# Patient Record
Sex: FEMALE | Race: WHITE | Employment: STUDENT | URBAN - METROPOLITAN AREA
[De-identification: names, ages, dates, MRNs, and addresses within clinical notes are randomized per-mention and may not be internally consistent; named-entity substitution may affect disease eponyms.]

---
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
## 2018-07-11 ENCOUNTER — OFFICE VISIT (OUTPATIENT)
Dept: INTERNAL MEDICINE CLINIC | Facility: CLINIC | Age: 18
End: 2018-07-11

## 2018-07-11 VITALS
BODY MASS INDEX: 19.1 KG/M2 | WEIGHT: 126 LBS | SYSTOLIC BLOOD PRESSURE: 102 MMHG | HEART RATE: 91 BPM | OXYGEN SATURATION: 98 % | HEIGHT: 68 IN | DIASTOLIC BLOOD PRESSURE: 60 MMHG | TEMPERATURE: 98 F

## 2018-07-11 DIAGNOSIS — Z30.011 BCP (BIRTH CONTROL PILLS) INITIATION: ICD-10-CM

## 2018-07-11 DIAGNOSIS — Z00.00 ANNUAL PHYSICAL EXAM: Primary | ICD-10-CM

## 2018-07-11 LAB
CONTROL LINE PRESENT WITH A CLEAR BACKGROUND (YES/NO): YES YES/NO
KIT LOT #: NORMAL NUMERIC
PREGNANCY TEST, URINE: NEGATIVE

## 2018-07-11 PROCEDURE — 81025 URINE PREGNANCY TEST: CPT | Performed by: INTERNAL MEDICINE

## 2018-07-11 PROCEDURE — 99395 PREV VISIT EST AGE 18-39: CPT | Performed by: INTERNAL MEDICINE

## 2018-07-11 RX ORDER — FEXOFENADINE HCL AND PSEUDOEPHEDRINE HCI 180; 240 MG/1; MG/1
1 TABLET, EXTENDED RELEASE ORAL AS NEEDED
COMMUNITY

## 2018-07-11 RX ORDER — NORGESTIMATE AND ETHINYL ESTRADIOL 0.25-0.035
1 KIT ORAL DAILY
Qty: 3 PACKAGE | Refills: 3 | Status: SHIPPED | OUTPATIENT
Start: 2018-07-11 | End: 2018-11-07

## 2018-07-11 NOTE — PROGRESS NOTES
Cheko Patterson is a 21year old female. Patient presents with:  Physical: Physical needed for college. she's currently in her [employer] year. She will rtc to drop off physical form to be completed. Interested in birth control plan.        HPI:   Cheko Leonardo contacts and recently had her eyes examined. She is not worried about her weight, and has no problems with eating. Her mother is accompanying her, and denies family history of early cardiac death, or sudden death before the age of 48.  She denies any fa Type II.     • Alcohol and Other Disorders Associated Maternal Grandfather    • Lipids Maternal Grandfather    • Stroke Maternal Grandfather    • High Cholesterol Maternal Grandfather    • High Cholesterol Paternal Grandmother    • High Blood Pressure Pater bilaterally  MUSCULOSKELETAL: normal range of motion of cervical, thoracic and lumbar spine. No scoliosis. Able to squat and walk 10 ft without discomfort, jumping on each foot 5 times without discomfort. Normal gait.    NEURO: 2+ biceps and patellar reflex

## 2018-08-03 ENCOUNTER — TELEPHONE (OUTPATIENT)
Dept: OTOLARYNGOLOGY | Facility: CLINIC | Age: 18
End: 2018-08-03

## 2018-08-03 ENCOUNTER — OFFICE VISIT (OUTPATIENT)
Dept: OTOLARYNGOLOGY | Facility: CLINIC | Age: 18
End: 2018-08-03
Payer: COMMERCIAL

## 2018-08-03 VITALS
WEIGHT: 126 LBS | SYSTOLIC BLOOD PRESSURE: 112 MMHG | BODY MASS INDEX: 19.1 KG/M2 | TEMPERATURE: 98 F | HEIGHT: 68 IN | DIASTOLIC BLOOD PRESSURE: 56 MMHG

## 2018-08-03 DIAGNOSIS — R04.0 EPISTAXIS: Primary | ICD-10-CM

## 2018-08-03 PROCEDURE — 99203 OFFICE O/P NEW LOW 30 MIN: CPT | Performed by: OTOLARYNGOLOGY

## 2018-08-03 PROCEDURE — 30901 CONTROL OF NOSEBLEED: CPT | Performed by: OTOLARYNGOLOGY

## 2018-08-03 NOTE — TELEPHONE ENCOUNTER
Pt advised symptoms are not abnormal. Pt to avoid blowing nose, use neosporin as prescribed. Pt verbalized understanding.

## 2018-08-03 NOTE — TELEPHONE ENCOUNTER
Pt called stating pt was seen today 8-3-18. Pt's nose was cauterized. Now pt is sneezing, runny nose and watery eyes.   Please call pt to advise

## 2018-08-04 NOTE — PROGRESS NOTES
Paul Tang is a 19year old female. Patient presents with:  Epistaxis: nose bleed at her both nostrils for 3 weeks     HPI:   For some time she has been experiencing problems with nosebleeds.   Bleeding can occur randomly and can be relatively heavy at nerves II through XII grossly intact. Neck Exam Normal Inspection - Normal. Palpation - Normal. Parotid gland - Normal. Thyroid gland - Normal.   Psychiatric Normal Orientation - Oriented to time, place, person & situation. Appropriate mood and affect.

## 2018-08-21 ENCOUNTER — TELEPHONE (OUTPATIENT)
Dept: INTERNAL MEDICINE CLINIC | Facility: CLINIC | Age: 18
End: 2018-08-21

## 2018-08-22 NOTE — TELEPHONE ENCOUNTER
Nurse called patient and spoke to mother as per HIPPA. Nurse stated that the forms are ready for pickup at the [address] in [address]. Patient mother verbalized understanding.

## 2018-11-07 ENCOUNTER — TELEPHONE (OUTPATIENT)
Dept: INTERNAL MEDICINE CLINIC | Facility: CLINIC | Age: 18
End: 2018-11-07

## 2018-11-07 RX ORDER — NORGESTIMATE AND ETHINYL ESTRADIOL 0.25-0.035
1 KIT ORAL DAILY
Qty: 90 TABLET | Refills: 3 | Status: SHIPPED | OUTPATIENT
Start: 2018-11-07 | End: 2019-06-28

## 2018-11-07 NOTE — TELEPHONE ENCOUNTER
[facility] requesting refill for:  Norgest/E ES (Estarylla) TB 28S 0.025/35, Qty 90 w/4 refills  Tasked to [employer]

## 2019-06-28 ENCOUNTER — OFFICE VISIT (OUTPATIENT)
Dept: INTERNAL MEDICINE CLINIC | Facility: CLINIC | Age: 19
End: 2019-06-28
Payer: COMMERCIAL

## 2019-06-28 VITALS
SYSTOLIC BLOOD PRESSURE: 106 MMHG | DIASTOLIC BLOOD PRESSURE: 74 MMHG | OXYGEN SATURATION: 99 % | WEIGHT: 130.13 LBS | TEMPERATURE: 99 F | BODY MASS INDEX: 19.72 KG/M2 | HEIGHT: 68 IN | HEART RATE: 84 BPM

## 2019-06-28 DIAGNOSIS — Z30.41 ENCOUNTER FOR SURVEILLANCE OF CONTRACEPTIVE PILLS: ICD-10-CM

## 2019-06-28 DIAGNOSIS — Z00.00 ANNUAL PHYSICAL EXAM: Primary | ICD-10-CM

## 2019-06-28 DIAGNOSIS — Z12.4 CERVICAL CANCER SCREENING: ICD-10-CM

## 2019-06-28 PROCEDURE — 99395 PREV VISIT EST AGE 18-39: CPT | Performed by: INTERNAL MEDICINE

## 2019-06-28 RX ORDER — NORGESTIMATE AND ETHINYL ESTRADIOL 0.25-0.035
1 KIT ORAL DAILY
Qty: 90 TABLET | Refills: 3 | Status: SHIPPED | OUTPATIENT
Start: 2019-06-28 | End: 2019-12-09

## 2019-06-28 NOTE — PROGRESS NOTES
Shemar Rhoades is a 19year old female. Patient presents with:  Physical: Physical with PAP; Needs Physical/Clearance for Camp      HPI:   Shemar Rhoades is a 19year old female who presents for a complete physical exam and sports physical.     She is at accompanying her, and denies family history of early cardiac death, or sudden death before the age of 48. She denies any family history of abnormal heart rhythms, pacemakers or defibrillators.  Her mother also reports that the patient has a healthy diet, an • Cancer Other         PGGFA- lung   • Other (Other) Other         hearing loss. maternal great grandmother   • Melanoma Other         Family h/o Malignant Melanoma - Relative?    • Hypertension Other         Family h/o Hypertension   • Other (Other) Othe while at [employer]. Encouraged to continue a healthy diet full of protein and vegetables while in college. She is UTD on vaccinations and will consider HPV vaccination at this time. Encouraged to get a flu shot in the fall. Follow up pap done today    2.  OCP

## 2019-07-03 ENCOUNTER — TELEPHONE (OUTPATIENT)
Dept: INTERNAL MEDICINE CLINIC | Facility: CLINIC | Age: 19
End: 2019-07-03

## 2019-07-23 ENCOUNTER — TELEPHONE (OUTPATIENT)
Dept: INTERNAL MEDICINE CLINIC | Facility: CLINIC | Age: 19
End: 2019-07-23

## 2019-07-23 NOTE — TELEPHONE ENCOUNTER
[facility] requesting refill for:  Norgest/E ES 0.25/35, Qty 90 w/4 refills  Tasked to [employer]

## 2019-11-14 RX ORDER — NORGESTIMATE AND ETHINYL ESTRADIOL 0.25-0.035
KIT ORAL
Qty: 3 PACKAGE | Refills: 3 | OUTPATIENT
Start: 2019-11-14

## 2019-11-14 NOTE — TELEPHONE ENCOUNTER
Current refill request refused due to refill is either a duplicate request or has active refills at the pharmacy. Check previous templates.     Requested Prescriptions     Pending Prescriptions Disp Refills   • ESTARYLLA 0.25-35 MG-MCG Oral Tab Patel STEPHENSON

## 2019-12-09 ENCOUNTER — TELEPHONE (OUTPATIENT)
Dept: INTERNAL MEDICINE CLINIC | Facility: CLINIC | Age: 19
End: 2019-12-09

## 2019-12-09 RX ORDER — NORGESTIMATE AND ETHINYL ESTRADIOL 0.25-0.035
1 KIT ORAL DAILY
Qty: 84 TABLET | Refills: 1 | Status: SHIPPED | OUTPATIENT
Start: 2019-12-09 | End: 2020-05-08

## 2019-12-09 NOTE — TELEPHONE ENCOUNTER
Pt requesting that NEW RX for   Norgestimate Eth Estradiol be sent to [facility]  Refill was sent in error to local pharmacy in June  Pt is out of medication  Tasked to [employer]

## 2019-12-09 NOTE — TELEPHONE ENCOUNTER
Reviewed and Rx done  Requested Prescriptions     Signed Prescriptions Disp Refills   • Norgestimate-Eth Estradiol (ESTARYLLA) 0.25-35 MG-MCG Oral Tab 84 tablet 1     Sig: Take 1 tablet by mouth daily.      Authorizing Provider: Sandip Jewell     Ordering Use

## 2020-05-08 RX ORDER — NORGESTIMATE AND ETHINYL ESTRADIOL 0.25-0.035
KIT ORAL
Qty: 84 TABLET | Refills: 3 | Status: SHIPPED | OUTPATIENT
Start: 2020-05-08 | End: 2021-03-17

## 2020-05-08 NOTE — TELEPHONE ENCOUNTER
Pt called to check status on refill  Scheduled physical w/Dr Tomasa Rajan for June 29  Please send refill to [facility]

## 2020-06-29 ENCOUNTER — OFFICE VISIT (OUTPATIENT)
Dept: INTERNAL MEDICINE CLINIC | Facility: CLINIC | Age: 20
End: 2020-06-29
Payer: COMMERCIAL

## 2020-06-29 VITALS
TEMPERATURE: 97 F | SYSTOLIC BLOOD PRESSURE: 122 MMHG | HEART RATE: 92 BPM | BODY MASS INDEX: 19.85 KG/M2 | DIASTOLIC BLOOD PRESSURE: 84 MMHG | HEIGHT: 68 IN | OXYGEN SATURATION: 99 % | WEIGHT: 131 LBS

## 2020-06-29 DIAGNOSIS — Z00.00 ANNUAL PHYSICAL EXAM: Primary | ICD-10-CM

## 2020-06-29 PROCEDURE — 99395 PREV VISIT EST AGE 18-39: CPT | Performed by: INTERNAL MEDICINE

## 2020-06-29 RX ORDER — LORATADINE 10 MG/1
10 TABLET ORAL DAILY
COMMUNITY
End: 2021-09-14 | Stop reason: ALTCHOICE

## 2020-06-29 NOTE — PROGRESS NOTES
Frankie Duran is a 22year old female. Patient presents with:  Physical: Pap June 2019. Allergies: Patient having issues with cat allergies.        HPI:   Frankie Duran is a 22year old female who presents for a complete physical exam and sports physic surgery-dental      Family History   Problem Relation Age of Onset   • Asthma Mother    • Allergies Mother    • Depression Mother    • Migraines Mother    • Other (Other) Mother    • Hypertension Father 39   • Obesity Father    • Asthma Sister    • Diabete normal S1S2, no gallops or murmurs  GI: soft, NT, ND, NABS, no HSM  : deferred  EXTREMITIES: no cyanosis, clubbing or edema, +2 radial and DP pulses bilaterally  VASCULAR: +2 radial, carotid and DP pulses bilaterally      ASSESSMENT AND PLAN:     1.  Spor

## 2021-03-17 NOTE — TELEPHONE ENCOUNTER
Refill request for birth control routed for MD review   Patient will be due in June for physical  Last PAP 2019  90 day supply pended    To on call Dr Abhishek Deng to please review

## 2021-03-18 RX ORDER — NORGESTIMATE AND ETHINYL ESTRADIOL 0.25-0.035
KIT ORAL
Qty: 84 TABLET | Refills: 1 | Status: SHIPPED | OUTPATIENT
Start: 2021-03-18 | End: 2021-08-17

## 2021-03-19 NOTE — TELEPHONE ENCOUNTER
eRx sent. Please ask pt to schedule PE with Dr. Israel Feliciano -- would check with Dr. Israel Feliciano re what her schedule is going to look like.

## 2021-03-19 NOTE — TELEPHONE ENCOUNTER
Patient is due for an annual physical in June 2021 as indicated below by nursing. There is no exact schedule for Dr Mikaela Gaston at this time. Patient will be called closer to June 2021 when we have an exact return date and schedule for Dr Mikaela Gaston.

## 2021-08-15 NOTE — TELEPHONE ENCOUNTER
Patient last saw Mireya Colmenares on  6/29/2020. Needs appointment for annual PE.  To EMA  to please call patient and assist with scheduling then back to Rx group for refill

## 2021-08-16 ENCOUNTER — APPOINTMENT (OUTPATIENT)
Dept: GENERAL RADIOLOGY | Age: 21
End: 2021-08-16
Attending: NURSE PRACTITIONER
Payer: COMMERCIAL

## 2021-08-16 ENCOUNTER — HOSPITAL ENCOUNTER (OUTPATIENT)
Age: 21
Discharge: HOME | End: 2021-08-16
Payer: COMMERCIAL

## 2021-08-16 VITALS
WEIGHT: 130 LBS | BODY MASS INDEX: 19.7 KG/M2 | HEART RATE: 96 BPM | OXYGEN SATURATION: 100 % | TEMPERATURE: 97 F | RESPIRATION RATE: 16 BRPM | HEIGHT: 68 IN | SYSTOLIC BLOOD PRESSURE: 132 MMHG | DIASTOLIC BLOOD PRESSURE: 84 MMHG

## 2021-08-16 DIAGNOSIS — S93.401A SPRAIN OF RIGHT ANKLE, UNSPECIFIED LIGAMENT, INITIAL ENCOUNTER: Primary | ICD-10-CM

## 2021-08-16 PROCEDURE — 73610 X-RAY EXAM OF ANKLE: CPT | Performed by: NURSE PRACTITIONER

## 2021-08-16 PROCEDURE — 99213 OFFICE O/P EST LOW 20 MIN: CPT | Performed by: NURSE PRACTITIONER

## 2021-08-16 PROCEDURE — L4350 ANKLE CONTROL ORTHO PRE OTS: HCPCS | Performed by: NURSE PRACTITIONER

## 2021-08-16 RX ORDER — ESCITALOPRAM OXALATE 10 MG/1
TABLET ORAL
COMMUNITY
Start: 2021-08-13

## 2021-08-16 NOTE — ED PROVIDER NOTES
Patient presents with: Ankle Pain      HPI:     Aliyah Olguin is a 19year old female who presents today with a chief complaint of pain in the right ankle after rolling her ankle yesterday. The patient has swelling and bruising present.   She is able to Occupational History      Not on file    Tobacco Use      Smoking status: Never Smoker      Smokeless tobacco: Never Used    Vaping Use      Vaping Use: Never used    Substance and Sexual Activity      Alcohol use: Yes        Comment: social      Drug use: ankle with ambulation and range of motion. No deformity. No foot pain. Moving all the toes of the right foot without difficulty. No calf pain. No Achilles pain. No heel pain.   Point Tenderness: No     Physical Exam:  /84   Pulse 96   Temp 96.9 applied to the right ankle. CMS intact post application. I will recommend ice, elevation, ibuprofen or Tylenol for pain, and follow-up with orthopedics. Diagnosis:    ICD-10-CM    1.  Sprain of right ankle, unspecified ligament, initial encounter  S93.40

## 2021-08-16 NOTE — ED INITIAL ASSESSMENT (HPI)
Pt states twisted R ankle while walking in platform shoes at 130pm yesterday. States fell to ground but denies any other injury. No head injury. No LOC. Positive swelling noted to lateral aspect R ankle. Positive CMS. Positive pedal pulse.   Awake/alert

## 2021-08-17 RX ORDER — NORGESTIMATE AND ETHINYL ESTRADIOL 0.25-0.035
KIT ORAL
Qty: 84 TABLET | Refills: 0 | Status: SHIPPED | OUTPATIENT
Start: 2021-08-17 | End: 2021-11-09

## 2021-08-17 NOTE — TELEPHONE ENCOUNTER
Noted, refill sent.     Requested Prescriptions     Signed Prescriptions Disp Refills   • ESTARYLLA 0.25-35 MG-MCG Oral Tab 84 tablet 0     Sig: TAKE 1 TABLET DAILY     Authorizing Provider: Lindsay Hoover     Ordering User: Carmelita Tyler

## 2021-09-14 ENCOUNTER — OFFICE VISIT (OUTPATIENT)
Dept: INTERNAL MEDICINE CLINIC | Facility: CLINIC | Age: 21
End: 2021-09-14
Payer: COMMERCIAL

## 2021-09-14 VITALS
WEIGHT: 137 LBS | TEMPERATURE: 98 F | BODY MASS INDEX: 20.76 KG/M2 | RESPIRATION RATE: 14 BRPM | HEIGHT: 68 IN | HEART RATE: 74 BPM | OXYGEN SATURATION: 100 % | DIASTOLIC BLOOD PRESSURE: 78 MMHG | SYSTOLIC BLOOD PRESSURE: 122 MMHG

## 2021-09-14 DIAGNOSIS — Z00.00 ANNUAL PHYSICAL EXAM: Primary | ICD-10-CM

## 2021-09-14 DIAGNOSIS — F41.9 ANXIETY: ICD-10-CM

## 2021-09-14 PROCEDURE — 99395 PREV VISIT EST AGE 18-39: CPT | Performed by: INTERNAL MEDICINE

## 2021-09-14 PROCEDURE — 3074F SYST BP LT 130 MM HG: CPT | Performed by: INTERNAL MEDICINE

## 2021-09-14 PROCEDURE — 3008F BODY MASS INDEX DOCD: CPT | Performed by: INTERNAL MEDICINE

## 2021-09-14 PROCEDURE — 3078F DIAST BP <80 MM HG: CPT | Performed by: INTERNAL MEDICINE

## 2021-09-14 RX ORDER — ESCITALOPRAM OXALATE 5 MG/1
5 TABLET ORAL DAILY
COMMUNITY

## 2021-09-14 NOTE — PROGRESS NOTES
Grace Salas is a 19year old female. Patient presents with:  Physical: Annual physical. Pt reports she has been seeing Psych and taking Lexapro. States she is feeling better.        HPI:   Grace Salas is a 19year old female who presents for a comple 2006    oral surgery-dental      Family History   Problem Relation Age of Onset   • Asthma Mother    • Allergies Mother    • Depression Mother    • Migraines Mother    • Other (Other) Mother    • Hypertension Father 39   • Obesity Father    • Asthma Sister carotic bruits, no thyromegaly  LUNGS: clear to auscultation  CARDIO: RRR, normal S1S2, no gallops or murmurs  GI: soft, NT, ND, NABS, no HSM  : deferred  EXTREMITIES: no cyanosis, clubbing or edema, +2 radial and DP pulses bilaterally  VASCULAR: +2 radi

## 2021-11-04 ENCOUNTER — TELEPHONE (OUTPATIENT)
Dept: INTERNAL MEDICINE CLINIC | Facility: CLINIC | Age: 21
End: 2021-11-04

## 2021-11-09 RX ORDER — NORGESTIMATE AND ETHINYL ESTRADIOL 0.25-0.035
KIT ORAL
Qty: 84 TABLET | Refills: 3 | Status: SHIPPED | OUTPATIENT
Start: 2021-11-09

## 2022-11-03 RX ORDER — NORGESTIMATE AND ETHINYL ESTRADIOL 0.25-0.035
KIT ORAL
Qty: 28 TABLET | Refills: 0 | Status: SHIPPED | OUTPATIENT
Start: 2022-11-03

## 2022-11-21 ENCOUNTER — OFFICE VISIT (OUTPATIENT)
Dept: INTERNAL MEDICINE CLINIC | Facility: CLINIC | Age: 22
End: 2022-11-21
Payer: COMMERCIAL

## 2022-11-21 VITALS
SYSTOLIC BLOOD PRESSURE: 106 MMHG | OXYGEN SATURATION: 99 % | DIASTOLIC BLOOD PRESSURE: 70 MMHG | WEIGHT: 136 LBS | BODY MASS INDEX: 20.38 KG/M2 | HEART RATE: 78 BPM | TEMPERATURE: 98 F | HEIGHT: 68.4 IN

## 2022-11-21 DIAGNOSIS — Z12.4 SCREENING FOR CERVICAL CANCER: ICD-10-CM

## 2022-11-21 DIAGNOSIS — F41.9 ANXIETY: ICD-10-CM

## 2022-11-21 DIAGNOSIS — Z00.00 ANNUAL PHYSICAL EXAM: Primary | ICD-10-CM

## 2022-11-21 PROCEDURE — 90471 IMMUNIZATION ADMIN: CPT | Performed by: INTERNAL MEDICINE

## 2022-11-21 PROCEDURE — 90686 IIV4 VACC NO PRSV 0.5 ML IM: CPT | Performed by: INTERNAL MEDICINE

## 2022-11-21 PROCEDURE — 3008F BODY MASS INDEX DOCD: CPT | Performed by: INTERNAL MEDICINE

## 2022-11-21 PROCEDURE — 3078F DIAST BP <80 MM HG: CPT | Performed by: INTERNAL MEDICINE

## 2022-11-21 PROCEDURE — 99395 PREV VISIT EST AGE 18-39: CPT | Performed by: INTERNAL MEDICINE

## 2022-11-21 PROCEDURE — 3074F SYST BP LT 130 MM HG: CPT | Performed by: INTERNAL MEDICINE

## 2022-11-21 RX ORDER — NORGESTIMATE AND ETHINYL ESTRADIOL 0.25-0.035
1 KIT ORAL DAILY
Qty: 28 TABLET | Refills: 11 | Status: SHIPPED | OUTPATIENT
Start: 2022-11-21

## 2022-11-21 RX ORDER — NORGESTIMATE AND ETHINYL ESTRADIOL 0.25-0.035
1 KIT ORAL DAILY
Qty: 28 TABLET | Refills: 0 | Status: SHIPPED | OUTPATIENT
Start: 2022-11-21 | End: 2022-11-21

## 2022-12-01 LAB — HPV I/H RISK 1 DNA SPEC QL NAA+PROBE: NEGATIVE

## 2022-12-05 LAB — LAST PAP RESULT: NORMAL

## 2023-12-01 RX ORDER — NORGESTIMATE AND ETHINYL ESTRADIOL 0.25-0.035
1 KIT ORAL DAILY
Qty: 28 TABLET | Refills: 0 | Status: SHIPPED | OUTPATIENT
Start: 2023-12-01 | End: 2023-12-04

## 2023-12-01 NOTE — TELEPHONE ENCOUNTER
Refill request is for a maintenance medication and has met the criteria specified in the Ambulatory Medication Refill Standing Order for eligibility, visits, laboratory, alerts and was sent to the requested pharmacy.     Requested Prescriptions     Signed Prescriptions Disp Refills    Norgestimate-Eth Estradiol (ESTARYLLA) 0.25-35 MG-MCG Oral Tab 28 tablet 0     Sig: TAKE 1 TABLET DAILY     Authorizing Provider: Viridiana Baker     Ordering User: Raya Euceda     Upcoming appt [id number]

## 2023-12-04 ENCOUNTER — OFFICE VISIT (OUTPATIENT)
Dept: INTERNAL MEDICINE CLINIC | Facility: CLINIC | Age: 23
End: 2023-12-04

## 2023-12-04 VITALS
TEMPERATURE: 99 F | DIASTOLIC BLOOD PRESSURE: 64 MMHG | HEART RATE: 77 BPM | WEIGHT: 145 LBS | HEIGHT: 68.4 IN | SYSTOLIC BLOOD PRESSURE: 108 MMHG | BODY MASS INDEX: 21.72 KG/M2 | OXYGEN SATURATION: 99 %

## 2023-12-04 DIAGNOSIS — Z00.00 ANNUAL PHYSICAL EXAM: Primary | ICD-10-CM

## 2023-12-04 PROCEDURE — 3074F SYST BP LT 130 MM HG: CPT | Performed by: INTERNAL MEDICINE

## 2023-12-04 PROCEDURE — 99395 PREV VISIT EST AGE 18-39: CPT | Performed by: INTERNAL MEDICINE

## 2023-12-04 PROCEDURE — 3078F DIAST BP <80 MM HG: CPT | Performed by: INTERNAL MEDICINE

## 2023-12-04 PROCEDURE — 90686 IIV4 VACC NO PRSV 0.5 ML IM: CPT | Performed by: INTERNAL MEDICINE

## 2023-12-04 PROCEDURE — 90471 IMMUNIZATION ADMIN: CPT | Performed by: INTERNAL MEDICINE

## 2023-12-04 PROCEDURE — 3008F BODY MASS INDEX DOCD: CPT | Performed by: INTERNAL MEDICINE

## 2023-12-04 RX ORDER — NORGESTIMATE AND ETHINYL ESTRADIOL 0.25-0.035
1 KIT ORAL DAILY
Qty: 90 TABLET | Refills: 3 | Status: CANCELLED | OUTPATIENT
Start: 2023-12-04

## 2023-12-04 RX ORDER — NORGESTIMATE AND ETHINYL ESTRADIOL 0.25-0.035
1 KIT ORAL DAILY
Qty: 90 TABLET | Refills: 3 | Status: SHIPPED | OUTPATIENT
Start: 2023-12-04

## 2023-12-28 ENCOUNTER — PATIENT MESSAGE (OUTPATIENT)
Dept: INTERNAL MEDICINE CLINIC | Facility: CLINIC | Age: 23
End: 2023-12-28

## 2024-01-04 LAB
ABSOLUTE BASOPHILS: 33 CELLS/UL (ref 0–200)
ABSOLUTE EOSINOPHILS: 149 CELLS/UL (ref 15–500)
ABSOLUTE LYMPHOCYTES: 2565 CELLS/UL (ref 850–3900)
ABSOLUTE MONOCYTES: 598 CELLS/UL (ref 200–950)
ABSOLUTE NEUTROPHILS: 4955 CELLS/UL (ref 1500–7800)
ALBUMIN/GLOBULIN RATIO: 1.6 (CALC) (ref 1–2.5)
ALBUMIN: 4.1 G/DL (ref 3.6–5.1)
ALKALINE PHOSPHATASE: 41 U/L (ref 31–125)
ALT: 11 U/L (ref 6–29)
AST: 16 U/L (ref 10–30)
BASOPHILS: 0.4 %
BILIRUBIN, TOTAL: 0.4 MG/DL (ref 0.2–1.2)
BUN: 13 MG/DL (ref 7–25)
CALCIUM: 9.3 MG/DL (ref 8.6–10.2)
CARBON DIOXIDE: 26 MMOL/L (ref 20–32)
CHLORIDE: 103 MMOL/L (ref 98–110)
CHOL/HDLC RATIO: 2.1 (CALC)
CHOLESTEROL, TOTAL: 163 MG/DL
CREATININE: 0.68 MG/DL (ref 0.5–0.96)
EGFR: 124 ML/MIN/1.73M2
EOSINOPHILS: 1.8 %
GLOBULIN: 2.6 G/DL (CALC) (ref 1.9–3.7)
GLUCOSE: 80 MG/DL (ref 65–99)
HDL CHOLESTEROL: 76 MG/DL
HEMATOCRIT: 37.4 % (ref 35–45)
HEMOGLOBIN: 12.2 G/DL (ref 11.7–15.5)
LDL-CHOLESTEROL: 62 MG/DL (CALC)
LYMPHOCYTES: 30.9 %
MCH: 29.3 PG (ref 27–33)
MCHC: 32.6 G/DL (ref 32–36)
MCV: 89.7 FL (ref 80–100)
MONOCYTES: 7.2 %
MPV: 11.2 FL (ref 7.5–12.5)
NEUTROPHILS: 59.7 %
NON-HDL CHOLESTEROL: 87 MG/DL (CALC)
PLATELET COUNT: 245 THOUSAND/UL (ref 140–400)
POTASSIUM: 4.5 MMOL/L (ref 3.5–5.3)
PROTEIN, TOTAL: 6.7 G/DL (ref 6.1–8.1)
RDW: 11.8 % (ref 11–15)
RED BLOOD CELL COUNT: 4.17 MILLION/UL (ref 3.8–5.1)
SODIUM: 136 MMOL/L (ref 135–146)
TRIGLYCERIDES: 171 MG/DL
TSH W/REFLEX TO FT4: 2.14 MIU/L
WHITE BLOOD CELL COUNT: 8.3 THOUSAND/UL (ref 3.8–10.8)

## 2025-02-03 ENCOUNTER — OFFICE VISIT (OUTPATIENT)
Dept: INTERNAL MEDICINE CLINIC | Facility: CLINIC | Age: 25
End: 2025-02-03

## 2025-02-03 VITALS
DIASTOLIC BLOOD PRESSURE: 58 MMHG | WEIGHT: 146.63 LBS | BODY MASS INDEX: 22.22 KG/M2 | TEMPERATURE: 98 F | OXYGEN SATURATION: 99 % | HEART RATE: 73 BPM | SYSTOLIC BLOOD PRESSURE: 102 MMHG | HEIGHT: 68 IN

## 2025-02-03 DIAGNOSIS — Z30.011 OCP (ORAL CONTRACEPTIVE PILLS) INITIATION: Primary | ICD-10-CM

## 2025-02-03 LAB
CONTROL LINE PRESENT WITH A CLEAR BACKGROUND (YES/NO): YES YES/NO
KIT LOT #: NORMAL NUMERIC

## 2025-02-03 PROCEDURE — 81025 URINE PREGNANCY TEST: CPT | Performed by: INTERNAL MEDICINE

## 2025-02-03 PROCEDURE — 99395 PREV VISIT EST AGE 18-39: CPT | Performed by: INTERNAL MEDICINE

## 2025-02-03 RX ORDER — NORGESTIMATE AND ETHINYL ESTRADIOL 0.25-0.035
1 KIT ORAL DAILY
Qty: 90 TABLET | Refills: 3 | Status: SHIPPED | OUTPATIENT
Start: 2025-02-03

## 2025-02-03 NOTE — PROGRESS NOTES
Ragini Renteria is a 24 year old female.  Chief Complaint   Patient presents with    Physical     Annual, birth control questions       HPI:   Ragini Renteria is a 24 year old female who presents for a complete physical exam.     She reports having stress fractures in her R foot previously. She had imaging studies done for this and the first time, was wearing a boot for 4 weeks and the second time for 6 weeks.    Started seeing psychiatry and therapy; been on lexapro since Spring 2021.     She denies any other medical history. She reports dental surgery without complication in 2006.       She reports beginning periods at age 14, and they have been regular since then. She denies heavy periods.   Pap 2022 normal  Sexually active, one partner. No new partners      She teaches dance, works with digital media    She reports hives with amoxicillin. She denies smoking, alcohol or illicit drug use. Her family history is updated and listed below.         TODAY:  Ran out of OCP a while ago; sex drive is down--realized it's related to lexapro.         Wt Readings from Last 6 Encounters:   02/03/25 146 lb 9.6 oz (66.5 kg)   12/04/23 145 lb (65.8 kg)   11/21/22 136 lb (61.7 kg)   09/14/21 137 lb (62.1 kg)   08/16/21 130 lb (59 kg)   06/29/20 131 lb (59.4 kg)     Body mass index is 22.29 kg/m².       Current Outpatient Medications   Medication Sig Dispense Refill    escitalopram 10 MG Oral Tab Take 1 tablet (10 mg total) by mouth daily.      Norgestimate-Eth Estradiol (ESTARYLLA) 0.25-35 MG-MCG Oral Tab Take 1 tablet by mouth daily. (Patient not taking: Reported on 2/3/2025) 90 tablet 3    Fexofenadine-Pseudoephed -240 MG Oral Tablet 24 Hr Take 1 tablet by mouth as needed.        Past Medical History:    Allergic rhinitis    Anxiety    Depression    History of pneumonia    Other drug allergy(995.27)    Amoxicillin allergy- hives    Stress fracture of foot    right, x2 stress fracture    Wears contact lenses      Past  Surgical History:   Procedure Laterality Date    Other surgical history  2006    oral surgery-dental      Family History   Problem Relation Age of Onset    Asthma Mother     Allergies Mother     Depression Mother     Migraines Mother     Other (Other) Mother     Hypertension Father 45    Obesity Father     Asthma Sister     Diabetes Maternal Grandfather         Type II.     Alcohol and Other Disorders Associated Maternal Grandfather     Lipids Maternal Grandfather     Stroke Maternal Grandfather     High Cholesterol Maternal Grandfather     High Cholesterol Paternal Grandmother     High Blood Pressure Paternal Grandmother     High Blood Pressure Maternal Grandfather     High Blood Pressure Maternal Grandmother     Lipids Maternal Grandmother     Cancer Other         PGGFA- lung    Other (Other) Other         hearing loss. maternal great grandmother    Melanoma Other         Family h/o Malignant Melanoma - Relative?    Hypertension Other         Family h/o Hypertension    Other (Other) Other       Social History:   Social History     Socioeconomic History    Marital status: Single   Tobacco Use    Smoking status: Never     Passive exposure: Never    Smokeless tobacco: Never   Vaping Use    Vaping status: Never Used   Substance and Sexual Activity    Alcohol use: Yes     Alcohol/week: 1.0 - 2.0 standard drink of alcohol     Types: 1 - 2 Standard drinks or equivalent per week     Comment: social    Drug use: No   Other Topics Concern    Pt has a pacemaker No    Pt has a defibrillator No    Reaction to local anesthetic No          REVIEW OF SYSTEMS:   GENERAL: feels well otherwise  SKIN: denies any unusual skin lesions  EYES:denies blurred vision or double vision  ENT: reports epitaxis  LUNGS: denies shortness of breath with exertion, cough or wheezing  CARDIOVASCULAR: denies chest pain, pressure, or palpitations  GI: denies abdominal pain, nausea, vomiting, diarrhea, constipation  : denies heavy menses    EXAM:   BP  102/58   Pulse 73   Temp 98.2 °F (36.8 °C)   Ht 5' 8\" (1.727 m)   Wt 146 lb 9.6 oz (66.5 kg)   SpO2 99%   BMI 22.29 kg/m²     GENERAL: well developed, well nourished, in no apparent distress  HEENT: normal oropharynx, normal TM's  EYES: PERRLA, EOMI, conjunctivae are pink  NECK: supple, no cervical or supraclavicular LAD, no carotic bruits, no thyromegaly  LUNGS: clear to auscultation  CARDIO: RRR, normal S1S2, no gallops or murmurs  GI: soft, NT, ND, NABS, no HSM  : deferred  EXTREMITIES: no cyanosis, clubbing or edema, +2 radial and DP pulses bilaterally  VASCULAR: +2 radial, carotid and DP pulses bilaterally      ASSESSMENT AND PLAN:     1. Annual physical exam  She will look into hpv and tdap vaccinations  L[facility] to be [facility]    2. OCP initiation  Urine preg test neg  Resume ocp      3. Allergies  Zyrtec, flonase    4. anxiety  lexapro 10mg daily; sees psychiatry--consider possibility of cutting back further to see if libido improves    Christy Bradford DO

## 2025-02-28 ENCOUNTER — OFFICE VISIT (OUTPATIENT)
Dept: FAMILY MEDICINE CLINIC | Facility: CLINIC | Age: 25
End: 2025-02-28
Payer: COMMERCIAL

## 2025-02-28 VITALS
DIASTOLIC BLOOD PRESSURE: 72 MMHG | OXYGEN SATURATION: 99 % | HEART RATE: 106 BPM | SYSTOLIC BLOOD PRESSURE: 106 MMHG | WEIGHT: 145 LBS | RESPIRATION RATE: 16 BRPM | TEMPERATURE: 99 F | BODY MASS INDEX: 22 KG/M2

## 2025-02-28 DIAGNOSIS — J02.9 SORE THROAT: ICD-10-CM

## 2025-02-28 DIAGNOSIS — J02.0 ACUTE STREPTOCOCCAL PHARYNGITIS: Primary | ICD-10-CM

## 2025-02-28 LAB
CONTROL LINE PRESENT WITH A CLEAR BACKGROUND (YES/NO): YES YES/NO
KIT LOT #: NORMAL NUMERIC
STREP GRP A CUL-SCR: POSITIVE

## 2025-02-28 PROCEDURE — 87880 STREP A ASSAY W/OPTIC: CPT

## 2025-02-28 PROCEDURE — 99203 OFFICE O/P NEW LOW 30 MIN: CPT

## 2025-02-28 RX ORDER — CETIRIZINE HYDROCHLORIDE 10 MG/1
10 TABLET ORAL DAILY
COMMUNITY

## 2025-02-28 RX ORDER — AZITHROMYCIN 250 MG/1
TABLET, FILM COATED ORAL
Qty: 6 TABLET | Refills: 0 | Status: SHIPPED | OUTPATIENT
Start: 2025-02-28 | End: 2025-03-05

## 2025-02-28 NOTE — PROGRESS NOTES
CHIEF COMPLAINT:     Chief Complaint   Patient presents with    Sore Throat         HPI:   Ragini Renteria is a 24 year old female presents to clinic with complaint of sore throat. Patient has had symptoms for 4 days. Symptoms have been worsening since onset.  Patient reports following associated symptoms: painful swallowing and nausea. Patient denies headache. Patient denies rash. Patient denies history of strep throat. Patient denies strep pharyngitis exposure. Treating symptoms with daily allergy medication.    Current Outpatient Medications   Medication Sig Dispense Refill    cetirizine 10 MG Oral Tab Take 1 tablet (10 mg total) by mouth daily.      azithromycin 250 MG Oral Tab Take 2 tablets (500 mg total) by mouth daily for 1 day, THEN 1 tablet (250 mg total) daily for 4 days. 6 tablet 0    Norgestimate-Eth Estradiol (ESTARYLLA) 0.25-35 MG-MCG Oral Tab Take 1 tablet by mouth daily. 90 tablet 3    escitalopram 10 MG Oral Tab Take 1 tablet (10 mg total) by mouth daily.      Fexofenadine-Pseudoephed -240 MG Oral Tablet 24 Hr Take 1 tablet by mouth as needed.        Past Medical History:    Allergic rhinitis    Anxiety    Depression    History of pneumonia    Other drug allergy(995.27)    Amoxicillin allergy- hives    Stress fracture of foot    right, x2 stress fracture    Wears contact lenses      Social History:  Social History     Socioeconomic History    Marital status: Single   Tobacco Use    Smoking status: Never     Passive exposure: Never    Smokeless tobacco: Never   Vaping Use    Vaping status: Never Used   Substance and Sexual Activity    Alcohol use: Yes     Alcohol/week: 1.0 - 2.0 standard drink of alcohol     Types: 1 - 2 Standard drinks or equivalent per week     Comment: social    Drug use: No   Other Topics Concern    Pt has a pacemaker No    Pt has a defibrillator No    Reaction to local anesthetic No        REVIEW OF SYSTEMS:   GENERAL HEALTH: Normal appetite  SKIN: Per HPI  HEENT:  denies ear pain, See HPI  RESPIRATORY: denies shortness of breath or wheezing  CARDIOVASCULAR: denies chest pain or palpitations   GI: denies vomiting or diarrhea  NEURO: denies dizziness or lightheadedness    EXAM:   /72   Pulse 106   Temp 98.5 °F (36.9 °C) (Oral)   Resp 16   Wt 145 lb (65.8 kg)   LMP 02/23/2025 (Exact Date)   SpO2 99%   BMI 22.05 kg/m²   GENERAL: well developed, well nourished,in no apparent distress  SKIN: no rashes,no suspicious lesions  HEAD: atraumatic, normocephalic  EYES: conjunctiva clear  EARS: TM's clear, non-injected, no bulging, retraction, or fluid bilaterally  NOSE: nostrils patent, clear nasal discharge, nasal mucosa moist  THROAT: oral mucosa pink, moist. Posterior pharynx erythematous and injected. No visible exudates. Tonsils 2/4.  Breath malodorous   NECK: supple  LUNGS: clear to auscultation bilaterally, no wheezes or rhonchi. Breathing is non labored.  CARDIO: RRR without murmur  EXTREMITIES: no cyanosis, clubbing or edema    Recent Results (from the past 24 hours)   Strep A Assay W/Optic    Collection Time: 02/28/25 10:44 AM   Result Value Ref Range    Strep Grp A Screen Positive Negative    Control Line Present with a clear background (yes/no) Yes Yes/No    Kit Lot # [id number] Numeric    Kit Expiration Date 12/20/2025 Date         ASSESSMENT AND PLAN:   Assessment:   Encounter Diagnoses   Name Primary?    Acute streptococcal pharyngitis Yes    Sore throat      Orders Placed This Encounter   Procedures    Strep A Assay W/Optic       Plan: Education provided.  Questions answered.  Reassurance given. Reviewed POC test results with patient. Reassuring physical exam findings. Vitals WNL. No sign of RDS or dehydration at this time. Discussed that due to symptoms, physical exam findings, and positive rapid strep will treat with antibiotics. Meds as ordered. START Azithromycin today. Discussed possible risks/side effects of medication along with expected course/duration of  illness.  Comfort measures explained and discussed as listed in Patient Instructions. Follow up with PCP in 3-5 days if not improving, condition worsens, or fever greater than or equal to 100.4 persists for 72 hours. The patient indicates understanding of these issues and agrees to the plan.      Requested Prescriptions     Signed Prescriptions Disp Refills    azithromycin 250 MG Oral Tab 6 tablet 0     Sig: Take 2 tablets (500 mg total) by mouth daily for 1 day, THEN 1 tablet (250 mg total) daily for 4 days.

## (undated) NOTE — LETTER
[place], [place]  [address] [facility], [place]       08/04/18        Patient: Ronny Anthony   YOB: 2000   Date of Visit: 8/3/2018       Dear  Dr. Zay Chand,      Thank you for referring Ronny Anthony to my practice.   P

## (undated) NOTE — MR AVS SNAPSHOT
[facility] [place]  [address] [place] [address]  [phone]               Thank you for choosing us for your health care visit with Smitha Fowler MD.  We are glad to serve you and happy to provide you with this summary of your visit.   Robert Commonly known as:  Gabriella Santamaria                Where to Get Your Medications      These medications were sent to [facility] [address], [place] [address]     Phone:  [phone] - az active are less likely to develop some chronic diseases than adults who are inactive.      HOW TO GET STARTED: HOW TO STAY MOTIVATED:   Start activities slowly and build up over time Do what you like   Get your heart pumping – brisk walking, biking, swimmin